# Patient Record
Sex: MALE | Race: BLACK OR AFRICAN AMERICAN | NOT HISPANIC OR LATINO | URBAN - METROPOLITAN AREA
[De-identification: names, ages, dates, MRNs, and addresses within clinical notes are randomized per-mention and may not be internally consistent; named-entity substitution may affect disease eponyms.]

---

## 2024-02-14 ENCOUNTER — OFFICE VISIT (OUTPATIENT)
Dept: URGENT CARE | Facility: CLINIC | Age: 34
End: 2024-02-14
Payer: COMMERCIAL

## 2024-02-14 VITALS
HEART RATE: 75 BPM | OXYGEN SATURATION: 98 % | HEIGHT: 64 IN | DIASTOLIC BLOOD PRESSURE: 82 MMHG | BODY MASS INDEX: 33.97 KG/M2 | TEMPERATURE: 97.9 F | RESPIRATION RATE: 18 BRPM | SYSTOLIC BLOOD PRESSURE: 126 MMHG | WEIGHT: 199 LBS

## 2024-02-14 DIAGNOSIS — R19.7 DIARRHEA, UNSPECIFIED TYPE: Primary | ICD-10-CM

## 2024-02-14 PROCEDURE — 99214 OFFICE O/P EST MOD 30 MIN: CPT | Performed by: PHYSICIAN ASSISTANT

## 2024-02-14 PROCEDURE — 1036F TOBACCO NON-USER: CPT | Performed by: PHYSICIAN ASSISTANT

## 2024-02-14 ASSESSMENT — PAIN SCALES - GENERAL: PAINLEVEL: 5

## 2024-02-14 NOTE — PROGRESS NOTES
"Subjective   Patient ID: Maxwell Rand is a 33 y.o. male who presents for Abdominal Pain, Nausea, and Diarrhea.  Patient notes that the symptoms came on today.  Denies any vomiting.  Notes 3 episodes of loose watery diarrhea abdominal cramping.  Denies any fevers or chills.  Denies any chest pain or shortness of breath.  Patient notes that his symptoms are relatively mild but he did not want to work today with the diarrhea and the only way to get out of work was to come to the urgent care for evaluation.  Patient works at [employer].  Patient states that he feels he will be well enough tomorrow but today his symptoms are too bothersome for him      The remainder of the systems were reviewed and are negative unless noted above      Objective   /82   Pulse 75   Temp 36.6 °C (97.9 °F) (Temporal)   Resp 18   Ht 1.626 m (5' 4\")   Wt 90.3 kg (199 lb)   SpO2 98%   BMI 34.16 kg/m²   Physical Exam  Constitutional:       General: He is not in acute distress.     Appearance: Normal appearance. He is not ill-appearing, toxic-appearing or diaphoretic.   HENT:      Head: Normocephalic and atraumatic.      Mouth/Throat:      Mouth: Mucous membranes are moist.      Pharynx: Oropharynx is clear.   Eyes:      Conjunctiva/sclera: Conjunctivae normal.   Cardiovascular:      Rate and Rhythm: Normal rate and regular rhythm.      Heart sounds: No murmur heard.  Pulmonary:      Effort: Pulmonary effort is normal. No respiratory distress.      Breath sounds: Normal breath sounds. No wheezing.   Abdominal:      General: Abdomen is flat. Bowel sounds are normal. There is no distension.      Palpations: Abdomen is soft.      Tenderness: There is no abdominal tenderness. There is no guarding or rebound.   Musculoskeletal:         General: No swelling, tenderness, deformity or signs of injury. Normal range of motion.      Cervical back: Normal range of motion and neck supple.   Skin:     General: Skin is warm and dry.      Findings: No " erythema or rash.   Neurological:      General: No focal deficit present.      Mental Status: He is alert and oriented to person, place, and time.      Gait: Gait normal.         Assessment/Plan   Problem List Items Addressed This Visit       Diarrhea - Primary      -Patient advised rest increase fluids, Imodium over-the-counter.  Symptoms are mild patient is afebrile and nontoxic-appearing the physical exam is essentially unremarkable.  I do not have any suspicion of significant emergent intra-abdominal process including bowel obstruction diverticulitis colitis pancreatitis choledocholithiasis cholecystitis appendicitis.  Patient may return to work tomorrow without restrictions